# Patient Record
Sex: FEMALE | Race: WHITE | NOT HISPANIC OR LATINO | Employment: FULL TIME | URBAN - METROPOLITAN AREA
[De-identification: names, ages, dates, MRNs, and addresses within clinical notes are randomized per-mention and may not be internally consistent; named-entity substitution may affect disease eponyms.]

---

## 2018-03-22 ENCOUNTER — HOSPITAL ENCOUNTER (EMERGENCY)
Facility: HOSPITAL | Age: 18
Discharge: HOME | End: 2018-03-22
Attending: EMERGENCY MEDICINE | Admitting: EMERGENCY MEDICINE
Payer: COMMERCIAL

## 2018-03-22 ENCOUNTER — APPOINTMENT (EMERGENCY)
Dept: ULTRASOUND IMAGING | Facility: HOSPITAL | Age: 18
End: 2018-03-22
Payer: COMMERCIAL

## 2018-03-22 VITALS
WEIGHT: 127 LBS | HEIGHT: 66 IN | OXYGEN SATURATION: 99 % | RESPIRATION RATE: 18 BRPM | BODY MASS INDEX: 20.41 KG/M2 | HEART RATE: 95 BPM | DIASTOLIC BLOOD PRESSURE: 70 MMHG | TEMPERATURE: 98.1 F | SYSTOLIC BLOOD PRESSURE: 102 MMHG

## 2018-03-22 DIAGNOSIS — Z34.91 FIRST TRIMESTER PREGNANCY: Primary | ICD-10-CM

## 2018-03-22 LAB
ABO GROUP BLD: NORMAL
ALBUMIN SERPL BCP-MCNC: 3.6 G/DL (ref 3.5–5)
ALP SERPL-CCNC: 55 U/L (ref 46–116)
ALT SERPL W P-5'-P-CCNC: 12 U/L (ref 12–78)
ANION GAP SERPL CALCULATED.3IONS-SCNC: 13 MMOL/L (ref 4–13)
AST SERPL W P-5'-P-CCNC: 13 U/L (ref 5–45)
B-HCG SERPL-ACNC: ABNORMAL MIU/ML
BASOPHILS # BLD AUTO: 0.04 THOUSANDS/ΜL (ref 0–0.1)
BASOPHILS NFR BLD AUTO: 0 % (ref 0–1)
BILIRUB SERPL-MCNC: 0.2 MG/DL (ref 0.2–1)
BLD GP AB SCN SERPL QL: NEGATIVE
BUN SERPL-MCNC: 10 MG/DL (ref 5–25)
CALCIUM SERPL-MCNC: 9.2 MG/DL (ref 8.3–10.1)
CHLORIDE SERPL-SCNC: 104 MMOL/L (ref 100–108)
CLARITY, POC: CLEAR
CO2 SERPL-SCNC: 22 MMOL/L (ref 21–32)
COLOR, POC: YELLOW
CREAT SERPL-MCNC: 0.59 MG/DL (ref 0.6–1.3)
EOSINOPHIL # BLD AUTO: 0.06 THOUSAND/ΜL (ref 0–0.61)
EOSINOPHIL NFR BLD AUTO: 1 % (ref 0–6)
ERYTHROCYTE [DISTWIDTH] IN BLOOD BY AUTOMATED COUNT: 13 % (ref 11.6–15.1)
EXT BILIRUBIN, UA: NEGATIVE
EXT BLOOD URINE: NEGATIVE
EXT GLUCOSE, UA: NEGATIVE
EXT KETONES: NORMAL
EXT NITRITE, UA: NEGATIVE
EXT PH, UA: 6.5
EXT PREG TEST URINE: POSITIVE
EXT PROTEIN, UA: NORMAL
EXT SPECIFIC GRAVITY, UA: 1.02
EXT UROBILINOGEN: 0.2
GFR SERPL CREATININE-BSD FRML MDRD: 132 ML/MIN/1.73SQ M
GLUCOSE SERPL-MCNC: 82 MG/DL (ref 65–140)
HCT VFR BLD AUTO: 36.4 % (ref 34.8–46.1)
HGB BLD-MCNC: 11.8 G/DL (ref 11.5–15.4)
LYMPHOCYTES # BLD AUTO: 1.71 THOUSANDS/ΜL (ref 0.6–4.47)
LYMPHOCYTES NFR BLD AUTO: 18 % (ref 14–44)
MCH RBC QN AUTO: 27.9 PG (ref 26.8–34.3)
MCHC RBC AUTO-ENTMCNC: 32.4 G/DL (ref 31.4–37.4)
MCV RBC AUTO: 86 FL (ref 82–98)
MONOCYTES # BLD AUTO: 0.44 THOUSAND/ΜL (ref 0.17–1.22)
MONOCYTES NFR BLD AUTO: 5 % (ref 4–12)
NEUTROPHILS # BLD AUTO: 7.16 THOUSANDS/ΜL (ref 1.85–7.62)
NEUTS SEG NFR BLD AUTO: 76 % (ref 43–75)
NRBC BLD AUTO-RTO: 0 /100 WBCS
PLATELET # BLD AUTO: 264 THOUSANDS/UL (ref 149–390)
PMV BLD AUTO: 10.5 FL (ref 8.9–12.7)
POTASSIUM SERPL-SCNC: 3.9 MMOL/L (ref 3.5–5.3)
PROT SERPL-MCNC: 7.6 G/DL (ref 6.4–8.2)
RBC # BLD AUTO: 4.23 MILLION/UL (ref 3.81–5.12)
RH BLD: POSITIVE
SODIUM SERPL-SCNC: 139 MMOL/L (ref 136–145)
SPECIMEN EXPIRATION DATE: NORMAL
WBC # BLD AUTO: 9.44 THOUSAND/UL (ref 4.31–10.16)
WBC # BLD EST: NEGATIVE 10*3/UL

## 2018-03-22 PROCEDURE — 81025 URINE PREGNANCY TEST: CPT | Performed by: EMERGENCY MEDICINE

## 2018-03-22 PROCEDURE — 36415 COLL VENOUS BLD VENIPUNCTURE: CPT | Performed by: EMERGENCY MEDICINE

## 2018-03-22 PROCEDURE — 86901 BLOOD TYPING SEROLOGIC RH(D): CPT | Performed by: EMERGENCY MEDICINE

## 2018-03-22 PROCEDURE — 84702 CHORIONIC GONADOTROPIN TEST: CPT | Performed by: EMERGENCY MEDICINE

## 2018-03-22 PROCEDURE — 87591 N.GONORRHOEAE DNA AMP PROB: CPT | Performed by: EMERGENCY MEDICINE

## 2018-03-22 PROCEDURE — 86900 BLOOD TYPING SEROLOGIC ABO: CPT | Performed by: EMERGENCY MEDICINE

## 2018-03-22 PROCEDURE — 86850 RBC ANTIBODY SCREEN: CPT | Performed by: EMERGENCY MEDICINE

## 2018-03-22 PROCEDURE — 85025 COMPLETE CBC W/AUTO DIFF WBC: CPT | Performed by: EMERGENCY MEDICINE

## 2018-03-22 PROCEDURE — 76801 OB US < 14 WKS SINGLE FETUS: CPT

## 2018-03-22 PROCEDURE — 99284 EMERGENCY DEPT VISIT MOD MDM: CPT

## 2018-03-22 PROCEDURE — 80053 COMPREHEN METABOLIC PANEL: CPT | Performed by: EMERGENCY MEDICINE

## 2018-03-22 PROCEDURE — 87491 CHLMYD TRACH DNA AMP PROBE: CPT | Performed by: EMERGENCY MEDICINE

## 2018-03-22 PROCEDURE — 81002 URINALYSIS NONAUTO W/O SCOPE: CPT | Performed by: EMERGENCY MEDICINE

## 2018-03-23 LAB
CHLAMYDIA DNA CVX QL NAA+PROBE: NORMAL
N GONORRHOEA DNA GENITAL QL NAA+PROBE: NORMAL

## 2018-03-23 NOTE — DISCHARGE INSTRUCTIONS
Pregnancy   WHAT YOU NEED TO KNOW:   A normal pregnancy lasts about 40 weeks  The first trimester lasts from your last period through the 12th week of pregnancy  The second trimester lasts from the 13th week of your pregnancy through the 23rd week  The third trimester lasts from your 24th week of pregnancy until your baby is born  If you know the date of your last period, your healthcare provider can estimate your due date  You may give birth to your baby any time from 37 weeks to 2 weeks after your due date  DISCHARGE INSTRUCTIONS:   Return to the emergency department if:   · You develop a severe headache that does not go away  · You have new or increased vision changes, such as blurred or spotted vision  · You have new or increased swelling in your face or hands  · You have pain or cramping in your abdomen or low back  · You have vaginal bleeding  Contact your healthcare provider or obstetrician if:   · You have abdominal cramps, pressure, or tightening  · You have a change in vaginal discharge  · You cannot keep food or drinks down, and you are losing weight  · You have chills or a fever  · You have vaginal itching, burning, or pain  · You have yellow, green, white, or foul-smelling vaginal discharge  · You have pain or burning when you urinate, less urine than usual, or pink or bloody urine  · You have questions or concerns about your condition or care  Medicines:   · Prenatal vitamins  provide some of the extra vitamins and minerals you need during pregnancy  Prenatal vitamins may also help to decrease the risk of certain birth defects  · Take your medicine as directed  Contact your healthcare provider if you think your medicine is not helping or if you have side effects  Tell him or her if you are allergic to any medicine  Keep a list of the medicines, vitamins, and herbs you take  Include the amounts, and when and why you take them   Bring the list or the pill bottles to follow-up visits  Carry your medicine list with you in case of an emergency  Follow up with your healthcare provider or obstetrician as directed:  Go to all of your prenatal visits during your pregnancy  Write down your questions so you remember to ask them during your visits  Body changes that may occur during your pregnancy:   · Breast changes  you will experience include tenderness and tingling during the early part of your pregnancy  Your breasts will become larger  You may need to use a support bra  You may see a thin, yellow fluid, called colostrum, leak from your nipples during the second trimester  Colostrum is a liquid that changes to milk about 3 days after you give birth  · Skin changes and stretch marks  may occur during your pregnancy  You may have red marks, called stretch marks, on your skin  Stretch marks will usually fade after pregnancy  Use lotion if your skin is dry and itchy  The skin on your face, around your nipples, and below your belly button may darken  Most of the time, your skin will return to its normal color after your baby is born  · Morning sickness  is nausea and vomiting that can happen at any time of day  Avoid fatty and spicy foods  Eat small meals throughout the day instead of large meals  [place] may help to decrease nausea  Ask your healthcare provider about other ways of decreasing nausea and vomiting  · Heartburn  may be caused by changes in your hormones during pregnancy  Your growing uterus may also push your stomach upward and force stomach acid to back up into your esophagus  Eat 4 or 5 small meals each day instead of large meals  Avoid spicy foods  Avoid eating right before bedtime  · Constipation  may develop during your pregnancy  To treat constipation, eat foods high in fiber such as fiber cereals, beans, fruits, vegetables, whole-grain breads, and prune juice  Get regular exercise and drink plenty of water   Your healthcare provider may also suggest a fiber supplement to soften your bowel movements  Talk to your healthcare provider before you use any medicines to decrease constipation  · Hemorrhoids  are enlarged veins in the rectal area  They may cause pain, itching, and bright red bleeding from your rectum  To decrease your risk of hemorrhoids, prevent constipation and do not strain to have a bowel movement  If you have hemorrhoids, soak in a tub of warm water to ease discomfort  Ask your healthcare provider how you can treat hemorrhoids  · Leg cramps and swelling  may be caused by low calcium levels or the added weight of pregnancy  Raise your legs above the level of your heart to decrease swelling  During a leg cramp, stretch or massage the muscle that has the cramp  Heat may help decrease pain and muscle spasms  Apply heat on your muscle for 20 to 30 minutes every 2 hours for as many days as directed  · Back pain  may occur as your baby grows  Do not stand for long periods of time or lift heavy items  Use good posture while you stand, squat, or bend  Wear low-heeled shoes with good support  Rest may also help to relieve back pain  Ask your healthcare provider about exercises you can do to strengthen your back muscles  Stay healthy during your pregnancy:   · Eat a variety of healthy foods  Healthy foods include fruits, vegetables, whole-grain breads, low-fat dairy foods, beans, lean meats, and fish  Drink liquids as directed  Ask how much liquid to drink each day and which liquids are best for you  Limit caffeine to less than 200 milligrams each day  Limit your intake of fish to 2 servings each week  Choose fish low in mercury such as canned light tuna, shrimp, crab, salmon, cod, or tilapia  Do not  eat fish high in mercury such as swordfish, tilefish, niecy mackerel, and shark  · Take prenatal vitamins as directed  Your need for certain vitamins and minerals, such as folic acid, increases during pregnancy   Prenatal vitamins provide some of the extra vitamins and minerals you need  Prenatal vitamins may also help to decrease the risk of certain birth defects  · Ask how much weight you should gain during your pregnancy  Too much or too little weight gain can be unhealthy for you and your baby  · Talk to your healthcare provider about exercise  Moderate exercise can help you stay fit  Your healthcare provider will help you plan an exercise program that is safe for you during pregnancy  · Do not smoke  If you smoke, it is never too late to quit  Smoking increases your risk of a miscarriage and other health problems during your pregnancy  Smoking can cause your baby to be born too early or weigh less at birth  Ask your healthcare provider for information if you need help quitting  · Do not drink alcohol  Alcohol passes from your body to your baby through the placenta  It can affect your baby's brain development and cause fetal alcohol syndrome (FAS)  FAS is a group of conditions that causes mental, behavior, and growth problems  · Talk to your healthcare provider before you take any medicines  Many medicines may harm your baby if you take them when you are pregnant  Do not take any medicines, vitamins, herbs, or supplements without first talking to your healthcare provider  Never use illegal or street drugs (such as marijuana or cocaine) while you are pregnant  Safety tips:   · Avoid hot tubs and saunas  Do not use a hot tub or sauna while you are pregnant, especially during your first trimester  Hot tubs and saunas may raise your baby's temperature and increase the risk of birth defects  · Avoid toxoplasmosis  This is an infection caused by eating raw meat or being around infected cat feces  It can cause birth defects, miscarriages, and other problems  Wash your hands after you touch raw meat  Make sure any meat is well-cooked before you eat it  Avoid raw eggs and unpasteurized milk   Use gloves or ask someone else to clean your cat's litter box while you are pregnant  · Ask your healthcare provider about travel  The most comfortable time to travel is during the second trimester  Ask your healthcare provider if you can travel after 36 weeks  You may not be able to travel in an airplane after 36 weeks  He may also recommend that you avoid long road trips  © 2017 [employer] Information is for End User's use only and may not be sold, redistributed or otherwise used for commercial purposes  All illustrations and images included in CareNotes® are the copyrighted property of [employer]  or [employer]gr[employer]  The above information is an  only  It is not intended as medical advice for individual conditions or treatments  Talk to your doctor, nurse or pharmacist before following any medical regimen to see if it is safe and effective for you  Pregnancy   WHAT YOU NEED TO KNOW:   What do I need to know about pregnancy? A normal pregnancy lasts about 40 weeks  The first trimester lasts from your last period through the 12th week of pregnancy  The second trimester lasts from the 13th week of your pregnancy through the 23rd week  The third trimester lasts from your 24th week of pregnancy until your baby is born  If you know the date of your last period, your healthcare provider can estimate your due date  You may give birth to your baby any time from 37 weeks to 2 weeks after your due date  What is prenatal care? Prenatal care is a series of visits with your healthcare provider throughout your pregnancy  Prenatal care can help prevent problems during pregnancy and childbirth  At each prenatal visit, your healthcare provider will weigh you and check your blood pressure  Your healthcare provider will also check your baby's heartbeat and growth  You may also need the following at some visits:  · A pelvic exam  allows your healthcare provider to see your cervix (the bottom part of your uterus)   Your healthcare provider uses a speculum to gently open your vagina  He will check the size and shape of your uterus  · Blood tests  may be done to check for gestational diabetes and anemia (low iron level)  You may need other blood tests, such as blood type, Rh factor, or tests to check for birth defects  · A fetal ultrasound  shows pictures of your baby inside your uterus  It shows your baby's development  The movement and position of your baby can also be seen  Your healthcare provider may be able to tell you what your baby's gender is during the ultrasound  What can I do to have a healthy pregnancy? · Eat a variety of healthy foods  Healthy foods include fruits, vegetables, whole-grain breads, low-fat dairy foods, beans, lean meats, and fish  Drink liquids as directed  Ask how much liquid to drink each day and which liquids are best for you  Limit caffeine to less than 200 milligrams each day  Limit your intake of fish to 2 servings each week  Choose fish low in mercury such as canned light tuna, shrimp, crab, salmon, cod, or tilapia  Do not  eat fish high in mercury such as swordfish, tilefish, niecy mackerel, and shark  · Take prenatal vitamins as directed  Your need for certain vitamins and minerals, such as folic acid, increases during pregnancy  Prenatal vitamins provide some of the extra vitamins and minerals you need  Prenatal vitamins may also help to decrease the risk of certain birth defects  · Ask how much weight you should gain during your pregnancy  Too much or too little weight gain can be unhealthy for you and your baby  · Talk to your healthcare provider about exercise  Moderate exercise can help you stay fit  Your healthcare provider will help you plan an exercise program that is safe for you during pregnancy  · Do not smoke  If you smoke, it is never too late to quit  Smoking increases your risk of a miscarriage and other health problems during your pregnancy   Smoking can cause your baby to be born too early or weigh less at birth  Ask your healthcare provider for information if you need help quitting  · Do not drink alcohol  Alcohol passes from your body to your baby through the placenta  It can affect your baby's brain development and cause fetal alcohol syndrome (FAS)  FAS is a group of conditions that causes mental, behavior, and growth problems  · Talk to your healthcare provider before you take any medicines  Many medicines may harm your baby if you take them when you are pregnant  Do not take any medicines, vitamins, herbs, or supplements without first talking to your healthcare provider  Never use illegal or street drugs (such as marijuana or cocaine) while you are pregnant  What body changes may happen during my pregnancy? · Breast changes  you will experience include tenderness and tingling during the early part of your pregnancy  Your breasts will become larger  You may need to use a support bra  You may see a thin, yellow fluid, called colostrum, leak from your nipples during the second trimester  Colostrum is a liquid that changes to milk about 3 days after you give birth  · Skin changes and stretch marks  may occur during your pregnancy  You may have red marks, called stretch marks, on your skin  Stretch marks will usually fade after pregnancy  Use lotion if your skin is dry and itchy  The skin on your face, around your nipples, and below your belly button may darken  Most of the time, your skin will return to its normal color after your baby is born  · Morning sickness  is nausea and vomiting that can happen at any time of day  Avoid fatty and spicy foods  Eat small meals throughout the day instead of large meals  [place] may help to decrease nausea  Ask your healthcare provider about other ways of decreasing nausea and vomiting  · Heartburn  may be caused by changes in your hormones during pregnancy   Your growing uterus may also push your stomach upward and force stomach acid to back up into your esophagus  Eat 4 or 5 small meals each day instead of large meals  Avoid spicy foods  Avoid eating right before bedtime  · Constipation  may develop during your pregnancy  To treat constipation, eat foods high in fiber such as fiber cereals, beans, fruits, vegetables, whole-grain breads, and prune juice  Get regular exercise and drink plenty of water  Your healthcare provider may also suggest a fiber supplement to soften your bowel movements  Talk to your healthcare provider before you use any medicines to decrease constipation  · Hemorrhoids  are enlarged veins in the rectal area  They may cause pain, itching, and bright red bleeding from your rectum  To decrease your risk of hemorrhoids, prevent constipation and do not strain to have a bowel movement  If you have hemorrhoids, soak in a tub of warm water to ease discomfort  Ask your healthcare provider how you can treat hemorrhoids  · Leg cramps and swelling  may be caused by low calcium levels or the added weight of pregnancy  Raise your legs above the level of your heart to decrease swelling  During a leg cramp, stretch or massage the muscle that has the cramp  Heat may help decrease pain and muscle spasms  Apply heat on your muscle for 20 to 30 minutes every 2 hours for as many days as directed  · Back pain  may occur as your baby grows  Do not stand for long periods of time or lift heavy items  Use good posture while you stand, squat, or bend  Wear low-heeled shoes with good support  Rest may also help to relieve back pain  Ask your healthcare provider about exercises you can do to strengthen your back muscles  What are some safety tips during pregnancy? · Avoid hot tubs and saunas  Do not use a hot tub or sauna while you are pregnant, especially during your first trimester  Hot tubs and saunas may raise your baby's temperature and increase the risk of birth defects      · Avoid toxoplasmosis  This is an infection caused by eating raw meat or being around infected cat feces  It can cause birth defects, miscarriages, and other problems  Wash your hands after you touch raw meat  Make sure any meat is well-cooked before you eat it  Avoid raw eggs and unpasteurized milk  Use gloves or ask someone else to clean your cat's litter box while you are pregnant  · Ask your healthcare provider about travel  The most comfortable time to travel is during the second trimester  Ask your healthcare provider if you can travel after 36 weeks  You may not be able to travel in an airplane after 36 weeks  He may also recommend that you avoid long road trips  When should I seek immediate care? · You develop a severe headache that does not go away  · You have new or increased vision changes, such as blurred or spotted vision  · You have new or increased swelling in your face or hands  · You have pain or cramping in your abdomen or low back  · You have vaginal bleeding  When should I contact my healthcare provider? · You have abdominal cramps, pressure, or tightening  · You have a change in vaginal discharge  · You cannot keep food or drinks down, and you are losing weight  · You have chills or a fever  · You have vaginal itching, burning, or pain  · You have yellow, green, white, or foul-smelling vaginal discharge  · You have pain or burning when you urinate, less urine than usual, or pink or bloody urine  · You have questions or concerns about your condition or care  CARE AGREEMENT:   You have the right to help plan your care  Learn about your health condition and how it may be treated  Discuss treatment options with your caregivers to decide what care you want to receive  You always have the right to refuse treatment  The above information is an  only  It is not intended as medical advice for individual conditions or treatments   Talk to your doctor, nurse or pharmacist before following any medical regimen to see if it is safe and effective for you  ©  [employer] [employer] [employer] Information is for End User's use only and may not be sold, redistributed or otherwise used for commercial purposes  All illustrations and images included in CareNotes® are the copyrighted property of [employer]  or [address]  Threatened Miscarriage   WHAT YOU NEED TO KNOW:   A threatened miscarriage occurs when you have vaginal bleeding within the first 20 weeks of pregnancy  It means that a miscarriage may happen  A threatened miscarriage may also be called a threatened   DISCHARGE INSTRUCTIONS:   Seek care immediately if:   · You feel weak or faint  · Your pain or cramping in your abdomen or back gets worse  · You have vaginal bleeding that soaks 1 or more pads in an hour  · You pass material that looks like tissue or large clots  Contact your healthcare provider or obstetrician if:   · You have a fever  · You have trouble urinating, burning when you urinate, or feel a need to urinate often  · You have new or worsening vaginal bleeding  · You have vaginal pain or itching, or vaginal discharge that is yellow, green, or foul-smelling  · You have questions or concerns about your condition or care  Self-care: The following may help you manage your symptoms and decrease your risk for a miscarriage:  · Do not put anything in your vagina  Do not have sex, douche, or use tampons  These actions may increase your risk for infection and miscarriage  · Rest as directed  Do not exercise or do strenuous activities  These activities may cause  labor or miscarriage  Ask your healthcare provider what activities are okay to do  Stay healthy during pregnancy:   · Eat a variety of healthy foods  Healthy foods can help you get extra protein, water, and calories that you need while you are pregnant   Healthy foods include fruits, vegetables, whole-grain breads, low-fat dairy products, beans, lean meats, and fish  Avoid raw or undercooked meat and fish  Ask your healthcare provider if you need a special diet  · Take prenatal vitamins as directed  These help you get the right amount of vitamins and minerals  They may also decrease the risk of certain birth defects  · Do not drink alcohol or use illegal drugs  These can increase your risk for a miscarriage or harm your baby  · Do not smoke  Nicotine and other chemicals in cigarettes and cigars can harm your baby and cause miscarriage or  labor  Ask your healthcare provider for information if you currently smoke and need help to quit  E-cigarettes or smokeless tobacco still contain nicotine  Do not use these products  · Decrease your risk for an infection  Always wash your hands before eating or preparing meals  Do not spend time with people who are sick  Ask your healthcare provider if you need immunizations such as the flu or hepatitis B vaccine  Immunizations may decrease your risk for infections that could cause a miscarriage  · Manage your medical conditions  Keep your blood pressure and blood sugars under control  Maintain a healthy weight during pregnancy  Follow up with your obstetrician as directed: You may need to see your obstetrician frequently for ultrasounds or blood tests  Write down your questions so you remember to ask them during your visits  ©  [address] [employer] [employer] Information is for End User's use only and may not be sold, redistributed or otherwise used for commercial purposes  All illustrations and images included in CareNotes® are the copyrighted property of [employer]  or [employer]  The above information is an  only  It is not intended as medical advice for individual conditions or treatments   Talk to your doctor, nurse or pharmacist before following any medical regimen to see if it is safe and effective for you

## 2018-03-23 NOTE — ED PROVIDER NOTES
Pt Name: Pretty Leblanc  MRN: [id number]  [place] 2000  Age/Sex: 19 y o  female  Date of evaluation: 3/22/2018  PCP: Rubio Ortiz MD    [address] [place] [address]    Chief Complaint   Patient presents with    Vaginal Bleeding - Pregnant     Patient states she is approx 10 weeks pregnant 1st pregnancy, had one episode of bleeding when she went to the bathroom and wiped and saw a streak of blood  Per patient, she has not bled besides x 1 hour ago  Also reports lower abdominal cramping  HPI    Andry Galdamez presents to the Emergency Department complaining of spotting during pregnancy  Pelvic pain/ cramping as well  Patient has had confirmed pregnancy test but no exam or US by OBGYN at this point    HPI      Past Medical and Surgical History    History reviewed  No pertinent past medical history  History reviewed  No pertinent surgical history  History reviewed  No pertinent family history  Social History   Substance Use Topics    Smoking status: Never Smoker    Smokeless tobacco: Never Used    Alcohol use No              Allergies    No Known Allergies    Home Medications    Prior to Admission medications    Not on File           Review of Systems    Review of Systems   Constitutional: Negative for activity change, appetite change, chills, diaphoresis, fatigue and fever  HENT: Negative for congestion, postnasal drip, rhinorrhea, sinus pressure, sneezing and sore throat  Eyes: Negative for pain and visual disturbance  Respiratory: Negative for cough, chest tightness and shortness of breath  Cardiovascular: Negative for chest pain, palpitations and leg swelling  Gastrointestinal: Negative for abdominal distention, abdominal pain, constipation, diarrhea, nausea and vomiting  Endocrine: Negative for polydipsia, polyphagia and polyuria  Genitourinary: Positive for vaginal bleeding   Negative for decreased urine volume, difficulty urinating, dysuria, flank pain, frequency and hematuria  Musculoskeletal: Negative for arthralgias, gait problem, joint swelling and neck pain  Skin: Negative for pallor and rash  Allergic/Immunologic: Negative for immunocompromised state  Neurological: Negative for syncope, speech difficulty, weakness, light-headedness, numbness and headaches  All other systems reviewed and are negative  Physical Exam      ED Triage Vitals [03/22/18 1910]   Temperature Pulse Respirations Blood Pressure SpO2   98 1 °F (36 7 °C) 95 18 102/70 99 %      Temp Source Heart Rate Source Patient Position - Orthostatic VS BP Location FiO2 (%)   Oral Monitor -- -- --      Pain Score       --               Physical Exam   Constitutional: She is oriented to person, place, and time  She appears well-developed and well-nourished  No distress  HENT:   Head: Normocephalic and atraumatic  Nose: Nose normal    Mouth/Throat: Oropharynx is clear and moist    Eyes: Conjunctivae, EOM and lids are normal  Pupils are equal, round, and reactive to light  Neck: Normal range of motion  Neck supple  Cardiovascular: Normal rate, regular rhythm and normal heart sounds  Exam reveals no gallop and no friction rub  No murmur heard  Pulmonary/Chest: Effort normal and breath sounds normal  No accessory muscle usage  No respiratory distress  She has no wheezes  She has no rales  Abdominal: Soft  She exhibits no distension  There is no tenderness  There is no rebound and no guarding  Genitourinary: Vagina normal  Pelvic exam was performed with patient supine  Cervix exhibits no motion tenderness  No bleeding in the vagina  Neurological: She is alert and oriented to person, place, and time  No cranial nerve deficit or sensory deficit  Skin: Skin is warm and dry  No rash noted  She is not diaphoretic  No erythema  Psychiatric: She has a normal mood and affect   Her speech is normal and behavior is normal  Judgment and thought content normal    Nursing note and vitals reviewed  Assessment and Plan    Violeta Wright is a 19 y o  female who presents with vaginal bleeding/spotting  Physical examination unremarkable  Differential diagnosis (not completely inclusive) includes threatened   Plan will be to perform diagnostic testing and treat symptomatically        MDM    Diagnostic Results      Labs:    Results for orders placed or performed during the hospital encounter of 18   Chlamydia/GC amplified DNA by PCR   Result Value Ref Range    N gonorrhoeae, DNA Probe N  gonorrhoeae Amplified DNA Negative N  gonorrhoeae Amplified DNA Negative    Chlamydia, DNA Probe C  trachomatis Amplified DNA Negative C  trachomatis Amplified DNA Negative   CBC and differential   Result Value Ref Range    WBC 9 44 4 31 - 10 16 Thousand/uL    RBC 4 23 3 81 - 5 12 Million/uL    Hemoglobin 11 8 11 5 - 15 4 g/dL    Hematocrit 36 4 34 8 - 46 1 %    MCV 86 82 - 98 fL    MCH 27 9 26 8 - 34 3 pg    MCHC 32 4 31 4 - 37 4 g/dL    RDW 13 0 11 6 - 15 1 %    MPV 10 5 8 9 - 12 7 fL    Platelets 087 136 - 953 Thousands/uL    nRBC 0 /100 WBCs    Neutrophils Relative 76 (H) 43 - 75 %    Lymphocytes Relative 18 14 - 44 %    Monocytes Relative 5 4 - 12 %    Eosinophils Relative 1 0 - 6 %    Basophils Relative 0 0 - 1 %    Neutrophils Absolute 7 16 1 85 - 7 62 Thousands/µL    Lymphocytes Absolute 1 71 0 60 - 4 47 Thousands/µL    Monocytes Absolute 0 44 0 17 - 1 22 Thousand/µL    Eosinophils Absolute 0 06 0 00 - 0 61 Thousand/µL    Basophils Absolute 0 04 0 00 - 0 10 Thousands/µL   Comprehensive metabolic panel   Result Value Ref Range    Sodium 139 136 - 145 mmol/L    Potassium 3 9 3 5 - 5 3 mmol/L    Chloride 104 100 - 108 mmol/L    CO2 22 21 - 32 mmol/L    Anion Gap 13 4 - 13 mmol/L    BUN 10 5 - 25 mg/dL    Creatinine 0 59 (L) 0 60 - 1 30 mg/dL    Glucose 82 65 - 140 mg/dL    Calcium 9 2 8 3 - 10 1 mg/dL    AST 13 5 - 45 U/L    ALT 12 12 - 78 U/L    Alkaline Phosphatase 55 46 - 116 U/L    Total Protein 7 6 6 4 - 8 2 g/dL    Albumin 3 6 3 5 - 5 0 g/dL    Total Bilirubin 0 20 0 20 - 1 00 mg/dL    eGFR 132 ml/min/1 73sq m   hCG, quantitative   Result Value Ref Range    HCG, Quant 100,521 (H) <=6 mIU/mL   POCT pregnancy, urine   Result Value Ref Range    EXT PREG TEST UR (Ref: Negative) positive    POCT urinalysis dipstick   Result Value Ref Range    Color, UA yellow     Clarity, UA clear     EXT Glucose, UA (Ref: Negative) negative     EXT Bilirubin, UA (Ref: Negative) negative     EXT Ketones, UA (Ref: Negative) moderate     EXT Spec Grav, UA 1 025     EXT Blood, UA (Ref: Negative) negative     EXT pH, UA 6 5     EXT Protein, UA (Ref: Negative) trace     EXT Urobilinogen, UA (Ref: 0 2- 1 0) 0 2     EXT Leukocytes, UA (Ref: Negative) negative     EXT Nitrite, UA (Ref: Negative) negative    Type and screen   Result Value Ref Range    ABO Grouping A     Rh Factor Positive     Antibody Screen Negative     Specimen Expiration Date [id number]        All labs reviewed and utilized in the medical decision making process    Radiology:    US OB < 14 weeks with transvaginal   Final Result       Single live intrauterine gestation measuring 11 weeks 3 days  Workstation performed: [id number]             All radiology studies independently viewed by me and interpreted by the radiologist     Procedure    Procedures    CritCare Time      ED Course of Care and Re-Assessments      Medications - No data to display        FINAL IMPRESSION    Final diagnoses:   First trimester pregnancy         DISPOSITION/PLAN    Time reflects when diagnosis was documented in both MDM as applicable and the Disposition within this note     Time User Action Codes Description Comment    3/22/2018 10:15 PM Maryann Soares [Z34 [id number]] First trimester pregnancy       ED Disposition     ED Disposition Condition Comment    Discharge  [place] discharge to home/self care      Condition at discharge: Good        Follow-up Information     Follow up With Specialties Details Why Contact Info Additional Information    Tiago Clemons MD Obstetrics and Gynecology Schedule an appointment as soon as possible for a visit  ana [address]  100 [facility]       Za El MD Family Medicine Schedule an appointment as soon as possible for a visit  91 [facility] [address]  [facility] Emergency Department Emergency Medicine Go to As needed, If symptoms worsen [address]  [phone] [place] ED, [address], [place], [address]            PATIENT REFERRED TO:    MD Amadou Torres 19  100 [facility] 830 [facility]  [phone]    Schedule an appointment as soon as possible for a visit      Za El MD  91 [facility] 1466 [phone]    Schedule an appointment as soon as possible for a visit      [address] [facility] Emergency Department  34 [facility] [address] [phone]  Go to  As needed, If symptoms worsen      DISCHARGE MEDICATIONS:    There are no discharge medications for this patient  No discharge procedures on file           Renzo Delatorre, 911 [facility], DO  03/24/18 0591

## 2021-02-09 ENCOUNTER — APPOINTMENT (EMERGENCY)
Dept: RADIOLOGY | Facility: HOSPITAL | Age: 21
End: 2021-02-09
Payer: OTHER MISCELLANEOUS

## 2021-02-09 ENCOUNTER — HOSPITAL ENCOUNTER (EMERGENCY)
Facility: HOSPITAL | Age: 21
Discharge: HOME | End: 2021-02-09
Attending: EMERGENCY MEDICINE | Admitting: EMERGENCY MEDICINE
Payer: OTHER MISCELLANEOUS

## 2021-02-09 VITALS
HEART RATE: 84 BPM | DIASTOLIC BLOOD PRESSURE: 70 MMHG | TEMPERATURE: 98.1 F | HEIGHT: 66 IN | BODY MASS INDEX: 25.55 KG/M2 | OXYGEN SATURATION: 99 % | SYSTOLIC BLOOD PRESSURE: 113 MMHG | WEIGHT: 159 LBS | RESPIRATION RATE: 16 BRPM

## 2021-02-09 DIAGNOSIS — S63.501A RIGHT WRIST SPRAIN, INITIAL ENCOUNTER: Primary | ICD-10-CM

## 2021-02-09 PROCEDURE — 73110 X-RAY EXAM OF WRIST: CPT

## 2021-02-09 PROCEDURE — 99283 EMERGENCY DEPT VISIT LOW MDM: CPT

## 2021-02-09 PROCEDURE — 99282 EMERGENCY DEPT VISIT SF MDM: CPT | Performed by: PHYSICIAN ASSISTANT

## 2021-02-09 NOTE — ED PROVIDER NOTES
History  Chief Complaint   Patient presents with    Wrist Injury     right wrist injury while at work  reports pain and deformity to inner right wirst      Juliana Bustos is a well-appearing 22-year-old right hand dominant female with no significant pmhx arriving ambulatory to the emergency department for evaluation of right wrist pain and swelling occurring this morning around 9:00 a m  While at work  Patient works in [employer] and relates that she was pushing heavy food cart when she felt a pop along the ulnar surface of the right wrist/forearm  She reports associated sharp pain localized to this area that has since improved  She denies extremity numbness, weakness, paralysis  She denies prior injuries to the right wrist or pain/injury elsewhere  History provided by:  Patient  Wrist Pain  Severity:  Mild  Timing:  Constant  Chronicity:  New  Context:  Pushing objects at work  Relieved by:  None tried  Worsened by:  Certain movements  Associated symptoms: myalgias    Associated symptoms: no rash        None       History reviewed  No pertinent past medical history  History reviewed  No pertinent surgical history  History reviewed  No pertinent family history  I have reviewed and agree with the history as documented  E-Cigarette/Vaping     E-Cigarette/Vaping Substances     Social History     Tobacco Use    Smoking status: Never Smoker    Smokeless tobacco: Never Used   Substance Use Topics    Alcohol use: No    Drug use: No       Review of Systems   Constitutional: Negative  Musculoskeletal: Positive for arthralgias and myalgias  Skin: Negative for rash and wound  Neurological: Negative for weakness and numbness  All other systems reviewed and are negative  Physical Exam  Physical Exam  Vitals signs and nursing note reviewed  Constitutional:       General: She is not in acute distress  Appearance: She is well-developed  HENT:      Head: Normocephalic and atraumatic  Eyes:      Conjunctiva/sclera: Conjunctivae normal       Pupils: Pupils are equal, round, and reactive to light  Neck:      Musculoskeletal: Normal range of motion and neck supple  Cardiovascular:      Rate and Rhythm: Normal rate and regular rhythm  Pulses: Normal pulses  Radial pulses are 2+ on the right side and 2+ on the left side  Heart sounds: Normal heart sounds  Pulmonary:      Effort: Pulmonary effort is normal  No respiratory distress  Breath sounds: Normal breath sounds  No wheezing  Musculoskeletal: Normal range of motion  General: No tenderness  Comments: Right forearm/wrist: No obvious deformity on inspection  There is mild appreciable edema localized to the ulnar surface of the right forearm with tenderness to palpation  There is no tenderness to palpation of the radial/ulnar styloid processes  There is decreased range of motion at the right wrist predominantly with pronation/supination   strength intact bilaterally, opposition intact, patient able to wiggle all fingers  Capillary refill < 2 seconds in the digits with no sensory deficits to palpation throughout the [place]  Radial pulse 2+  Skin:     General: Skin is warm and dry  Capillary Refill: Capillary refill takes less than 2 seconds  Neurological:      Mental Status: She is alert  Sensory: No sensory deficit  Motor: No weakness        Gait: Gait normal          Vital Signs  ED Triage Vitals [02/09/21 1052]   Temperature Pulse Respirations Blood Pressure SpO2   98 1 °F (36 7 °C) 84 16 113/70 99 %      Temp Source Heart Rate Source Patient Position - Orthostatic VS BP Location FiO2 (%)   Oral -- Sitting Left arm --      Pain Score       7           Vitals:    02/09/21 1052   BP: 113/70   Pulse: 84   Patient Position - Orthostatic VS: Sitting         Visual Acuity      ED Medications  Medications - No data to display    Diagnostic Studies  Results Reviewed     None XR wrist 3+ views RIGHT   Final Result by Rebel Astudillo MD (02/09 1242)      No acute osseous abnormality  Workstation performed: [id number]                    Procedures  Procedures         ED Course                             SBIRT 20yo+      Most Recent Value   SBIRT (25 yo +)   In order to provide better care to our patients, we are screening all of our patients for alcohol and drug use  Would it be okay to ask you these screening questions? No Filed at: 02/09/2021 1058                    MDM  Number of Diagnoses or Management Options  Right wrist sprain, initial encounter: new and requires workup  Diagnosis management comments: This is a 19yo rhd female with no significant pmhx arriving ambulatory to the ED for evaluation of right wrist pain that had occurred at work this morning  The patient reported that she was pushing a heavy food tray though unsure of the weight exactly at which time she felt a pop at the ulnar surface of the right wrist  She presents with pain and localized swelling at this area  Denies extremity numbness, weakness, or paralysis  Differential diagnosis includes but not limited to: rule out acute fracture/dislocation, sprain, strain, tendinitis, effusion    Initial ED plan: X-ray right wrist, patient offered ibuprofen which she had pleasantly declined    Final ED Assessment:  Vital signs stable on arrival, examination as documented above  The right upper extremity is neurovascularly intact on assessment  X-ray independently reviewed with report of no acute osseous abnormality  The patient was advised of imaging findings and plan for treatment with supportive measures  Encouraged rest, ice, compression, elevation  May take tylenol/ibuprofen as needed for pain relief  The patient was provided a thumb spica splint for comfort  The patient had left the emergency department prior to splint check  Encouraged mobility as tolerated    The patient was provided sports medicine follow-up if symptoms unrelieved with supportive therapy as listed above  She was additionally provided occupational health follow up should she require a formal evaluation to determine work-related restrictions  Patient encouraged to return to the emergency department new worsening symptoms  She verbalized understanding and is agreeable with disposition plan  She is stable for discharge home  Amount and/or Complexity of Data Reviewed  Tests in the radiology section of CPT®: ordered and reviewed  Review and summarize past medical records: yes  Independent visualization of images, tracings, or specimens: yes    Risk of Complications, Morbidity, and/or Mortality  Presenting problems: low  Diagnostic procedures: low  Management options: low    Patient Progress  Patient progress: stable      Disposition  Final diagnoses:   Right wrist sprain, initial encounter     Time reflects when diagnosis was documented in both MDM as applicable and the Disposition within this note     Time User Action Codes Description Comment    2/9/2021 12:43 PM Bryant Bran Add [S63 501A] Right wrist sprain, initial encounter       ED Disposition     ED Disposition Condition Date/Time Comment    Discharge Stable Tue Feb 9, 2021 12:43 PM Yael Juarez discharge to home/self care              Follow-up Information     Follow up With Specialties Details Why Contact Info Additional Information    Dino Saez MD Family Medicine  As needed [facility] [address]  [address] [place] [address]  Emergency Department Emergency Medicine  If symptoms worsen 100 14 [facility] [address] [phone] [facility] Emergency Department, [address] [place], [place], [address] [place] [address], DO Sports Medicine  As needed 819 [facility]  [address]  [facility] [phone]       [address] [facility]   As needed [address] [id number] On license of [facility] [address] [phone]           There are no discharge medications for this patient  No discharge procedures on file      PDMP Review     None          ED Provider  Electronically Signed by           Diandra Scott PA-C  02/09/21 2969

## 2021-02-09 NOTE — Clinical Note
Judy Pati was seen and treated in our emergency department on 2/9/2021  Diagnosis: Wrist sprain    Maryanne Stevenson  may return to work on return date  She may return on this date: 02/10/2021         If you have any questions or concerns, please don't hesitate to call        Viktor Lira PA-C    ______________________________           _______________          _______________  Hospital Representative                              Date                                Time

## 2021-02-09 NOTE — DISCHARGE INSTRUCTIONS
Take tylenol/ibuprofen as needed for pain relief  Encourage ice and elevation  Wear splint as needed for pain relief  Follow up with Dr Beulah Berumen if symptoms persist  Return to the ED with new or worsening symptoms  Thank you for involving us in your care today

## 2021-02-11 ENCOUNTER — OCCMED (OUTPATIENT)
Dept: URGENT CARE | Facility: CLINIC | Age: 21
End: 2021-02-11
Payer: OTHER MISCELLANEOUS

## 2021-02-11 DIAGNOSIS — S63.502A SPRAIN OF LEFT WRIST, INITIAL ENCOUNTER: Primary | ICD-10-CM

## 2021-02-11 PROCEDURE — 99213 OFFICE O/P EST LOW 20 MIN: CPT | Performed by: PHYSICIAN ASSISTANT

## 2021-02-17 ENCOUNTER — APPOINTMENT (OUTPATIENT)
Dept: RADIOLOGY | Facility: CLINIC | Age: 21
End: 2021-02-17
Payer: OTHER MISCELLANEOUS

## 2021-02-17 ENCOUNTER — OCCMED (OUTPATIENT)
Dept: URGENT CARE | Facility: CLINIC | Age: 21
End: 2021-02-17
Payer: OTHER MISCELLANEOUS

## 2021-02-17 DIAGNOSIS — S63.501A SPRAIN AND STRAIN OF RIGHT WRIST: Primary | ICD-10-CM

## 2021-02-17 DIAGNOSIS — S66.911A SPRAIN AND STRAIN OF RIGHT WRIST: Primary | ICD-10-CM

## 2021-02-17 DIAGNOSIS — S63.501A SPRAIN AND STRAIN OF RIGHT WRIST: ICD-10-CM

## 2021-02-17 DIAGNOSIS — S66.911A SPRAIN AND STRAIN OF RIGHT WRIST: ICD-10-CM

## 2021-02-17 PROCEDURE — 99213 OFFICE O/P EST LOW 20 MIN: CPT | Performed by: PHYSICIAN ASSISTANT

## 2021-02-17 PROCEDURE — 73110 X-RAY EXAM OF WRIST: CPT

## 2021-03-04 ENCOUNTER — OCCMED (OUTPATIENT)
Dept: URGENT CARE | Facility: CLINIC | Age: 21
End: 2021-03-04
Payer: OTHER MISCELLANEOUS

## 2021-03-04 DIAGNOSIS — S63.509A SPRAIN OF WRIST, UNSPECIFIED LATERALITY, INITIAL ENCOUNTER: Primary | ICD-10-CM

## 2021-03-04 PROCEDURE — 99213 OFFICE O/P EST LOW 20 MIN: CPT | Performed by: PHYSICIAN ASSISTANT

## 2021-03-15 ENCOUNTER — APPOINTMENT (OUTPATIENT)
Dept: URGENT CARE | Facility: CLINIC | Age: 21
End: 2021-03-15
Payer: OTHER MISCELLANEOUS

## 2021-03-15 PROCEDURE — 99213 OFFICE O/P EST LOW 20 MIN: CPT

## 2021-04-05 ENCOUNTER — APPOINTMENT (OUTPATIENT)
Dept: URGENT CARE | Facility: CLINIC | Age: 21
End: 2021-04-05
Payer: OTHER MISCELLANEOUS

## 2021-04-05 PROCEDURE — 99213 OFFICE O/P EST LOW 20 MIN: CPT

## 2021-09-25 ENCOUNTER — APPOINTMENT (EMERGENCY)
Dept: RADIOLOGY | Facility: HOSPITAL | Age: 21
End: 2021-09-25
Payer: COMMERCIAL

## 2021-09-25 ENCOUNTER — APPOINTMENT (EMERGENCY)
Dept: CT IMAGING | Facility: HOSPITAL | Age: 21
End: 2021-09-25
Payer: COMMERCIAL

## 2021-09-25 ENCOUNTER — HOSPITAL ENCOUNTER (EMERGENCY)
Facility: HOSPITAL | Age: 21
Discharge: HOME | End: 2021-09-25
Attending: EMERGENCY MEDICINE | Admitting: EMERGENCY MEDICINE
Payer: COMMERCIAL

## 2021-09-25 VITALS
OXYGEN SATURATION: 99 % | HEART RATE: 81 BPM | WEIGHT: 158.95 LBS | SYSTOLIC BLOOD PRESSURE: 99 MMHG | RESPIRATION RATE: 16 BRPM | BODY MASS INDEX: 25.66 KG/M2 | TEMPERATURE: 98 F | DIASTOLIC BLOOD PRESSURE: 66 MMHG
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
## 2021-09-25 DIAGNOSIS — M54.9 UPPER BACK PAIN: Primary | ICD-10-CM

## 2021-09-25 DIAGNOSIS — S16.1XXA STRAIN OF NECK MUSCLE, INITIAL ENCOUNTER: ICD-10-CM

## 2021-09-25 LAB
ALBUMIN SERPL BCP-MCNC: 3.5 G/DL (ref 3.5–5)
ALP SERPL-CCNC: 62 U/L (ref 46–116)
ALT SERPL W P-5'-P-CCNC: 20 U/L (ref 12–78)
ANION GAP SERPL CALCULATED.3IONS-SCNC: 7 MMOL/L (ref 4–13)
APTT PPP: 29 SECONDS (ref 23–37)
AST SERPL W P-5'-P-CCNC: 20 U/L (ref 5–45)
ATRIAL RATE: 74 BPM
B-HCG SERPL-ACNC: <2 MIU/ML
BASOPHILS # BLD AUTO: 0.07 THOUSANDS/ΜL (ref 0–0.1)
BASOPHILS NFR BLD AUTO: 1 % (ref 0–1)
BILIRUB DIRECT SERPL-MCNC: 0.06 MG/DL (ref 0–0.2)
BILIRUB SERPL-MCNC: 0.12 MG/DL (ref 0.2–1)
BUN SERPL-MCNC: 11 MG/DL (ref 5–25)
CALCIUM SERPL-MCNC: 8.3 MG/DL (ref 8.3–10.1)
CHLORIDE SERPL-SCNC: 104 MMOL/L (ref 100–108)
CO2 SERPL-SCNC: 24 MMOL/L (ref 21–32)
CREAT SERPL-MCNC: 0.66 MG/DL (ref 0.6–1.3)
EOSINOPHIL # BLD AUTO: 0.38 THOUSAND/ΜL (ref 0–0.61)
EOSINOPHIL NFR BLD AUTO: 6 % (ref 0–6)
ERYTHROCYTE [DISTWIDTH] IN BLOOD BY AUTOMATED COUNT: 13 % (ref 11.6–15.1)
ETHANOL SERPL-MCNC: <3 MG/DL (ref 0–3)
GFR SERPL CREATININE-BSD FRML MDRD: 125 ML/MIN/1.73SQ M
GLUCOSE SERPL-MCNC: 91 MG/DL (ref 65–140)
HCT VFR BLD AUTO: 37.9 % (ref 34.8–46.1)
HGB BLD-MCNC: 12 G/DL (ref 11.5–15.4)
IMM GRANULOCYTES # BLD AUTO: 0.02 THOUSAND/UL (ref 0–0.2)
IMM GRANULOCYTES NFR BLD AUTO: 0 % (ref 0–2)
INR PPP: 0.96 (ref 0.84–1.19)
LYMPHOCYTES # BLD AUTO: 2.06 THOUSANDS/ΜL (ref 0.6–4.47)
LYMPHOCYTES NFR BLD AUTO: 30 % (ref 14–44)
MCH RBC QN AUTO: 28.3 PG (ref 26.8–34.3)
MCHC RBC AUTO-ENTMCNC: 31.7 G/DL (ref 31.4–37.4)
MCV RBC AUTO: 89 FL (ref 82–98)
MONOCYTES # BLD AUTO: 0.48 THOUSAND/ΜL (ref 0.17–1.22)
MONOCYTES NFR BLD AUTO: 7 % (ref 4–12)
NEUTROPHILS # BLD AUTO: 3.86 THOUSANDS/ΜL (ref 1.85–7.62)
NEUTS SEG NFR BLD AUTO: 56 % (ref 43–75)
NRBC BLD AUTO-RTO: 0 /100 WBCS
P AXIS: 72 DEGREES
PLATELET # BLD AUTO: 268 THOUSANDS/UL (ref 149–390)
PMV BLD AUTO: 10.5 FL (ref 8.9–12.7)
POTASSIUM SERPL-SCNC: 3.4 MMOL/L (ref 3.5–5.3)
PR INTERVAL: 130 MS
PROT SERPL-MCNC: 6.5 G/DL (ref 6.4–8.2)
PROTHROMBIN TIME: 12.5 SECONDS (ref 11.6–14.5)
QRS AXIS: 91 DEGREES
QRSD INTERVAL: 76 MS
QT INTERVAL: 396 MS
QTC INTERVAL: 439 MS
RBC # BLD AUTO: 4.24 MILLION/UL (ref 3.81–5.12)
SODIUM SERPL-SCNC: 135 MMOL/L (ref 136–145)
T WAVE AXIS: 60 DEGREES
TROPONIN I SERPL-MCNC: <0.02 NG/ML
VENTRICULAR RATE: 74 BPM
WBC # BLD AUTO: 6.87 THOUSAND/UL (ref 4.31–10.16)

## 2021-09-25 PROCEDURE — 85025 COMPLETE CBC W/AUTO DIFF WBC: CPT | Performed by: PHYSICIAN ASSISTANT

## 2021-09-25 PROCEDURE — 70450 CT HEAD/BRAIN W/O DYE: CPT

## 2021-09-25 PROCEDURE — 74177 CT ABD & PELVIS W/CONTRAST: CPT

## 2021-09-25 PROCEDURE — 80076 HEPATIC FUNCTION PANEL: CPT | Performed by: PHYSICIAN ASSISTANT

## 2021-09-25 PROCEDURE — 72125 CT NECK SPINE W/O DYE: CPT

## 2021-09-25 PROCEDURE — 36415 COLL VENOUS BLD VENIPUNCTURE: CPT | Performed by: PHYSICIAN ASSISTANT

## 2021-09-25 PROCEDURE — 82077 ASSAY SPEC XCP UR&BREATH IA: CPT | Performed by: PHYSICIAN ASSISTANT

## 2021-09-25 PROCEDURE — 84702 CHORIONIC GONADOTROPIN TEST: CPT | Performed by: PHYSICIAN ASSISTANT

## 2021-09-25 PROCEDURE — 93005 ELECTROCARDIOGRAM TRACING: CPT

## 2021-09-25 PROCEDURE — 85730 THROMBOPLASTIN TIME PARTIAL: CPT | Performed by: PHYSICIAN ASSISTANT

## 2021-09-25 PROCEDURE — 99285 EMERGENCY DEPT VISIT HI MDM: CPT

## 2021-09-25 PROCEDURE — 85610 PROTHROMBIN TIME: CPT | Performed by: PHYSICIAN ASSISTANT

## 2021-09-25 PROCEDURE — 71045 X-RAY EXAM CHEST 1 VIEW: CPT

## 2021-09-25 PROCEDURE — 84484 ASSAY OF TROPONIN QUANT: CPT | Performed by: PHYSICIAN ASSISTANT

## 2021-09-25 PROCEDURE — 99284 EMERGENCY DEPT VISIT MOD MDM: CPT | Performed by: PHYSICIAN ASSISTANT

## 2021-09-25 PROCEDURE — 71260 CT THORAX DX C+: CPT

## 2021-09-25 PROCEDURE — 93010 ELECTROCARDIOGRAM REPORT: CPT | Performed by: INTERNAL MEDICINE

## 2021-09-25 PROCEDURE — 80048 BASIC METABOLIC PNL TOTAL CA: CPT | Performed by: PHYSICIAN ASSISTANT

## 2021-09-25 RX ORDER — IBUPROFEN 400 MG/1
400 TABLET ORAL EVERY 6 HOURS PRN
Qty: 30 TABLET | Refills: 0 | Status: SHIPPED | OUTPATIENT
Start: 2021-09-25 | End: 2021-09-30

## 2021-09-25 RX ORDER — ACETAMINOPHEN AND CODEINE PHOSPHATE 300; 30 MG/1; MG/1
1-2 TABLET ORAL EVERY 6 HOURS PRN
Qty: 15 TABLET | Refills: 0 | Status: SHIPPED | OUTPATIENT
Start: 2021-09-25 | End: 2021-10-05

## 2021-09-25 RX ORDER — METHOCARBAMOL 750 MG/1
750 TABLET, FILM COATED ORAL EVERY 6 HOURS PRN
Qty: 20 TABLET | Refills: 0 | Status: SHIPPED | OUTPATIENT
Start: 2021-09-25 | End: 2021-09-30

## 2021-09-25 RX ADMIN — IOHEXOL 100 ML: 350 INJECTION, SOLUTION INTRAVENOUS at 07:57
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
## 2024-03-26 ENCOUNTER — APPOINTMENT (OUTPATIENT)
Dept: URGENT CARE | Facility: CLINIC | Age: 24
End: 2024-03-26

## 2024-06-18 ENCOUNTER — APPOINTMENT (OUTPATIENT)
Dept: URGENT CARE | Facility: CLINIC | Age: 24
End: 2024-06-18